# Patient Record
Sex: FEMALE | Race: WHITE | NOT HISPANIC OR LATINO | Employment: OTHER | URBAN - METROPOLITAN AREA
[De-identification: names, ages, dates, MRNs, and addresses within clinical notes are randomized per-mention and may not be internally consistent; named-entity substitution may affect disease eponyms.]

---

## 2022-03-14 ENCOUNTER — HOSPITAL ENCOUNTER (EMERGENCY)
Facility: HOSPITAL | Age: 67
End: 2022-03-15
Attending: EMERGENCY MEDICINE | Admitting: EMERGENCY MEDICINE

## 2022-03-14 VITALS — DIASTOLIC BLOOD PRESSURE: 90 MMHG | SYSTOLIC BLOOD PRESSURE: 112 MMHG

## 2022-03-14 DIAGNOSIS — I46.9 CARDIAC ARREST: Primary | ICD-10-CM

## 2022-03-14 PROCEDURE — 92950 HEART/LUNG RESUSCITATION CPR: CPT

## 2022-03-14 PROCEDURE — 82962 GLUCOSE BLOOD TEST: CPT

## 2022-03-14 PROCEDURE — 25010000002 EPINEPHRINE 1 MG/10ML SOLUTION PREFILLED SYRINGE: Performed by: EMERGENCY MEDICINE

## 2022-03-14 PROCEDURE — 99284 EMERGENCY DEPT VISIT MOD MDM: CPT

## 2022-03-14 PROCEDURE — 94799 UNLISTED PULMONARY SVC/PX: CPT

## 2022-03-14 RX ORDER — DEXTROSE MONOHYDRATE 25 G/50ML
INJECTION, SOLUTION INTRAVENOUS
Status: COMPLETED | OUTPATIENT
Start: 2022-03-14 | End: 2022-03-14

## 2022-03-14 RX ORDER — CALCIUM CHLORIDE 100 MG/ML
INJECTION INTRAVENOUS; INTRAVENTRICULAR
Status: COMPLETED | OUTPATIENT
Start: 2022-03-14 | End: 2022-03-14

## 2022-03-14 RX ORDER — EPINEPHRINE 0.1 MG/ML
SYRINGE (ML) INJECTION
Status: COMPLETED | OUTPATIENT
Start: 2022-03-14 | End: 2022-03-14

## 2022-03-14 RX ADMIN — SODIUM BICARBONATE 50 MEQ: 84 INJECTION INTRAVENOUS at 23:14

## 2022-03-14 RX ADMIN — DEXTROSE MONOHYDRATE 25 ML: 25 INJECTION, SOLUTION INTRAVENOUS at 23:15

## 2022-03-14 RX ADMIN — CALCIUM CHLORIDE 1 G: 100 INJECTION INTRAVENOUS; INTRAVENTRICULAR at 23:14

## 2022-03-14 RX ADMIN — EPINEPHRINE 1 MG: 0.1 INJECTION INTRACARDIAC; INTRAVENOUS at 23:13

## 2022-03-15 LAB — GLUCOSE BLDC GLUCOMTR-MCNC: 19 MG/DL (ref 70–99)

## 2022-03-15 NOTE — CODE DOCUMENTATION
Pt to ED from home per [facility] with reports of going to bathroom, family heard noise and found pt unresponsive.  Pt down estimated 45 minutes.  Pt arrived to ED with jose device performing CPR.  Pt intubated with 7.0 ETT per EMS, being bagged upon arrival to ED.

## 2022-03-15 NOTE — ED PROVIDER NOTES
Time: 11:11 PM EDT  Arrived by: Ambulance  Chief Complaint:   Chief Complaint   Patient presents with   • Cardiac Arrest     History provided by: EMS  History is limited by: N/A     History of Present Illness:    Kelly Floyd is a 67 year old female who presents to the emergency department today with complaints of cardiac arrest. The family informed EMS that the patient had excused herself to use the restroom she has been feeling ill. 10-15 minutes later, the family went to check on her and found her lying unresponsive on the restroom floor. They were unable to find a pulse at that time.    Upon EMS arrival, the patient was found to be in asystole. They began compressions immediately and administered epi, dextrose and bicarb. The patient was also intubated en route. Per EMS, the patient's total downtime is thought to be approximately 45 minutes at minimum, but may have been longer. There are no other known acute complaints at this time.       History provided by:  EMS personnel  History limited by:  Patient unresponsive and acuity of condition   used: No    Cardiac Arrest  Witnessed by:  Family member  Incident location:  Home  Time since incident:  45 minutes  Condition upon EMS arrival:  Unresponsive  Pulse:  Absent  Initial cardiac rhythm per EMS:  Asystole  Treatments prior to arrival:  Intubation (Epi/bicarb)  Airway:  Intubation prior to arrival  Rhythm on admission to ED:  Unchanged  Associated symptoms: loss of consciousness        Similar Symptoms Previously: Unknown.  Recently seen: No prior visits on file.      Patient Care Team  Primary Care Provider: No primary care provider on file.    Past Medical History:     Not on File  No past medical history on file.  No past surgical history on file.  No family history on file.    Home Medications:  Prior to Admission medications    Not on File        Social History:      Recent travel: not applicable     Review of Systems:  Review of Systems    Unable to perform ROS: Patient unresponsive   Cardiovascular:        Patient was in asystole for EMS.   Neurological: Positive for loss of consciousness.        Patient unresponsive.        Physical Exam:  /90   Pulse (!) 0     Physical Exam     Appearance: Ongoing CPR.  Unresponsive.  No visible trauma.    Eyes: Pupils fixed and dilated.    Neck: Normal inspection.    CVS: Chest compressions performed.  No spontaneous pulse.    Respiratory: Patient was intubated by EMS with a 7.0 tube. Bilateral breath sounds.  Abdomen: Soft.    Skin: Cyanosis.  Pallor.  Dependent lividity   Extremities: No lower extremity edema.    Neuro: Unresponsive.  No motor response to pain.                Medications in the Emergency Department:  Medications   EPINEPHrine (ADRENALIN) injection (1 mg Intravenous Given 3/14/22 2313)   sodium bicarbonate injection 8.4% (50 mEq Intravenous Given 3/14/22 2314)   calcium chloride injection (1 g Intravenous Given 3/14/22 2314)   dextrose (D50W) (25 g/50 mL) IV injection (25 mL Intravenous Given 3/14/22 2315)        Labs  Lab Results (last 24 hours)     ** No results found for the last 24 hours. **           Imaging:  No Radiology Exams Resulted Within Past 24 Hours    Procedures:  Procedures    Progress  ED Course as of 03/15/22 0000   Mon Mar 14, 2022   2320 Time of death is 2316. [RF]      ED Course User Index  [RF] Cherelle Ojeda                            Medical Decision Making:  MDM  Number of Diagnoses or Management Options  Cardiac arrest (HCC)  Diagnosis management comments: Patient presented to the emergency department as a CODE BLUE.  According to report she has been down at least 45 minutes likely longer.  When EMS arrived she was in asystole.  On arrival to the hospital she had dependent lividity.  Pupils were fixed and dilated.  CPR was continued.  She received epi, bicarb, dextrose and calcium chloride with no change.  Time of death was called at 23:16.  I discussed this  with family.  Family states that she has been feeling ill for last few days.  She did complain of some shortness of breath and chest discomfort over the last few days.    Risk of Complications, Morbidity, and/or Mortality  Presenting problems: high  Management options: high    Critical Care  Total time providing critical care: < 30 minutes (I spent 25 minutes providing critical care exclusive of procedures.   Time includes: direct patient care, patient reassessment, coordination of patient care, interpretation of data (laboratory data and imaging), review of patient's medical records, medical consultation, family consultation regarding treatment decisions and documentation of patient care.)       Final diagnoses:   Cardiac arrest (HCC)        Disposition:  ED Disposition     ED Disposition       Condition   --    Comment   --             Part of this note may be an electronic transcription/translation of spoken language to printed text using the Dragon Dictation System.     Documentation assistance provided by Cherelle Ojeda acting as scribe for Ember Rendon MD. Information recorded by the scribe was done at my direction and has been verified and validated by me.        Cherelle Ojeda  [id number]       Cherelle Ojeda  [id number]       Ember Rendon MD  03/15/22 0000